# Patient Record
Sex: FEMALE | Race: WHITE | HISPANIC OR LATINO
[De-identification: names, ages, dates, MRNs, and addresses within clinical notes are randomized per-mention and may not be internally consistent; named-entity substitution may affect disease eponyms.]

---

## 2019-04-21 ENCOUNTER — TRANSCRIPTION ENCOUNTER (OUTPATIENT)
Age: 24
End: 2019-04-21

## 2019-04-24 PROBLEM — Z00.00 ENCOUNTER FOR PREVENTIVE HEALTH EXAMINATION: Status: ACTIVE | Noted: 2019-04-24

## 2019-04-26 ENCOUNTER — APPOINTMENT (OUTPATIENT)
Dept: ORTHOPEDIC SURGERY | Facility: CLINIC | Age: 24
End: 2019-04-26
Payer: COMMERCIAL

## 2019-04-26 VITALS
WEIGHT: 155 LBS | HEIGHT: 65 IN | BODY MASS INDEX: 25.83 KG/M2 | SYSTOLIC BLOOD PRESSURE: 117 MMHG | DIASTOLIC BLOOD PRESSURE: 80 MMHG | HEART RATE: 72 BPM

## 2019-04-26 DIAGNOSIS — M47.817 SPONDYLOSIS W/OUT MYELOPATHY OR RADICULOPATHY, LUMBOSACRAL REGION: ICD-10-CM

## 2019-04-26 PROCEDURE — XXXXX: CPT

## 2019-11-26 PROBLEM — M47.817 LUMBOSACRAL SPONDYLOSIS: Status: ACTIVE | Noted: 2019-11-26

## 2020-02-29 ENCOUNTER — TRANSCRIPTION ENCOUNTER (OUTPATIENT)
Age: 25
End: 2020-02-29

## 2020-04-16 ENCOUNTER — EMERGENCY (EMERGENCY)
Facility: HOSPITAL | Age: 25
LOS: 1 days | Discharge: HOME | End: 2020-04-16
Attending: EMERGENCY MEDICINE | Admitting: EMERGENCY MEDICINE
Payer: COMMERCIAL

## 2020-04-16 VITALS
RESPIRATION RATE: 18 BRPM | SYSTOLIC BLOOD PRESSURE: 124 MMHG | TEMPERATURE: 98 F | OXYGEN SATURATION: 98 % | HEART RATE: 90 BPM | WEIGHT: 160.06 LBS | DIASTOLIC BLOOD PRESSURE: 89 MMHG

## 2020-04-16 DIAGNOSIS — R05 COUGH: ICD-10-CM

## 2020-04-16 PROCEDURE — 99284 EMERGENCY DEPT VISIT MOD MDM: CPT

## 2020-04-16 PROCEDURE — 99283 EMERGENCY DEPT VISIT LOW MDM: CPT

## 2020-04-16 PROCEDURE — 87635 SARS-COV-2 COVID-19 AMP PRB: CPT

## 2020-04-16 RX ORDER — ACETAMINOPHEN 500 MG
650 TABLET ORAL ONCE
Refills: 0 | Status: COMPLETED | OUTPATIENT
Start: 2020-04-16 | End: 2020-04-16

## 2020-04-16 RX ORDER — ALBUTEROL 90 UG/1
2 AEROSOL, METERED ORAL
Qty: 1 | Refills: 0
Start: 2020-04-16 | End: 2020-04-22

## 2020-04-16 RX ADMIN — Medication 650 MILLIGRAM(S): at 21:37

## 2020-04-16 NOTE — ED ADULT NURSE NOTE - OBJECTIVE STATEMENT
Pt is alert, came to the ER due to cough and headache for the last 2 weeks. Pt denies any medical or surgical problems, non smoker.

## 2020-04-16 NOTE — ED ADULT TRIAGE NOTE - CHIEF COMPLAINT QUOTE
pt presents to ED for evaluation of allergic like symptoms. Pt c/o generalized rash, itching, swollen eyes and constant coughs x 2 weeks.

## 2020-04-16 NOTE — ED ADULT NURSE NOTE - NSIMPLEMENTINTERV_GEN_ALL_ED
Implemented All Universal Safety Interventions:  [place] to call system. Call bell, personal items and telephone within reach. Instruct patient to call for assistance. Room bathroom lighting operational. Non-slip footwear when patient is off stretcher. Physically safe environment: no spills, clutter or unnecessary equipment. Stretcher in lowest position, wheels locked, appropriate side rails in place.

## 2020-04-16 NOTE — ED PROVIDER NOTE - CLINICAL SUMMARY MEDICAL DECISION MAKING FREE TEXT BOX
24 yr old female with hx of asthma presents with cough, nasal congestion, headache for the last 2 weeks. Pt reports intermittent hand itching, currently resolved due to taking benadryl. 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 Pt denies any chest pain, sob or any other symptoms   well appearing, nasal congestion noted , lungs clear, VSS   covid swab sent, stable for dc with home isolation, and supportive tx

## 2020-04-16 NOTE — ED PROVIDER NOTE - OBJECTIVE STATEMENT
24 yr old female with hx of asthma presents with cough, nasal congestion, headache for the last 2 weeks. Pt reports intermittent hand itching, currently resolved due to taking benadryl. 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 Pt denies any chest pain, sob or any other symptoms

## 2020-04-16 NOTE — ED PROVIDER NOTE - ATTENDING CONTRIBUTION TO CARE
I have personally seen and examined this patient. I have fully participated in the care of this patient. I have reviewed all pertinent clinical information, including history physical exam, plan and the PA's note and agree except as noted  24 yr old female with hx of asthma presents with cough, nasal congestion, headache for the last 2 weeks. Pt reports intermittent hand itching, currently resolved due to taking benadryl. 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 Pt denies any chest pain, sob or any other symptoms  PE; General:     NAD, well-nourished, well-appearing  Head:     NC/AT, EOMI, oral mucosa moist  Neck:     supple  Lungs:     CTA b/l, no w/r/r  CVS:     S1S2, RRR, no m/g/r  Abd:     +BS, s/nt/nd, no organomegaly  Ext:    2+ radial and pedal pulses, no c/c/e  Neuro: grossly intact

## 2020-04-16 NOTE — ED PROVIDER NOTE - PATIENT PORTAL LINK FT
You can access the FollowMyHealth Patient Portal offered by [facility] by registering at the following website: [email or web address]. By joining [employer]’s FollowMyHealth portal, you will also be able to view your health information using other applications (apps) compatible with our system.

## 2020-04-16 NOTE — ED PROVIDER NOTE - NSFOLLOWUPINSTRUCTIONS_ED_ALL_ED_FT
1. You were seen in the ED and underwent testing for the novel coronarvirus (COVID-19). The results are not back yet and you will be contacted with the result which can take up to 7 days. You were also tested for other common viruses such as the flu and cold viruses. You will be notified if you test positive for any of these.    2. Until your test results are back, YOU MUST SELF-QUARANTINE until you are told to other otherwise by [facility] or the local [facility] department. This is extremely important to limit the spread of this virus. Please refer closely to the packet provided to you on the specifics of the process of self-quarantine.    3. If you end up testing positive for the virus, you will instructed as to when you can return to your usual activities. If you do not hear from anyone in 7 days, please call [phone] or your local health department for guidance.     4. Return to the ED for difficulty breathing.    5. You may over the counter acetaminophen (Tylenol) 650mg every 6 hours as needed for fever or pain. There is some concern in the medical community about using ibuprofen (Advil, Motrin) and other NSAIDs in people with COVID infections and until there is more research on this subject it may be best to avoid NSAIDs like ibuprofen at the moment unless you have an allergy to acetaminophen (Tylenol).  Do NOT exceed 3500mg acetaminophen in 24 hours.  Please do not take these medications if you do not have pain or fever or if you have any history of liver disease.     -------------    What is a coronavirus?  Coronaviruses are a large family of viruses that cause illnesses ranging from the common cold to more severe diseases such as Middle East Respiratory Syndrome (MERS) and Severe Acute Respiratory Syndrome (SARS).    What is Novel Coronavirus (COVID-19)?  The Centers for Disease Control and Prevention (CDC) is closely monitoring the outbreak caused by COVID-19. For the latest information about COVID-19, visit the CDC website at CDC.gov/Coronavirus    How are coronaviruses spread?  Coronaviruses can be transmitted from person to person, usually after close contact with an infected  person (for example, in a household, workplace, or healthcare setting), via droplets that become airborne after a cough or sneeze. These droplets can then infect a nearby person. Transmission can also occur by touching recently contaminated surfaces.    Is there a treatment for a COVID-19?  There is no specific treatment for disease caused by COVID-19. However, many of the symptoms can be treated based on the patient’s clinical condition. Supportive care for infected persons can be highly effective.    What are the symptoms of coronavirus infection?  It depends on the virus, but common signs include fever and/or respiratory symptoms such as cough and shortness of breath. In more severe cases, infection can cause pneumonia, severe acute respiratory syndrome, kidney failure and even death. Fortunately, most cases of COVID-19 have an illness no different than the influenza (flu), with a majority of these patients having mild symptoms and overall mortality which appears to be not much different than the flu.    What can I do to protect myself?  The best precautionary measures:  – washing your hands  – covering your cough  – disinfecting surfaces  – it is also advisable to avoid close contact with anyone showing symptoms of respiratory illness such as coughing and sneezing  – those with symptoms should wear a surgical mask when around others    What can I do to protect those around me?  If you have been identified as someone who may be infected with COVID-19, we recommend you follow the self-isolation procedures outlined on the following page to protect those around you and to limit the spread of this virus.    We recommend the below precautionary steps from now until 14 days from when you returned from your travel or date of your last known possible contact:    — Do not go to work, school or public areas. Avoid using public transportation, ridesharing or taxis.  — As much as possible, separate yourself from other people in your home. If you can, you should stay in a room and away from other people. Also, you should use a separate bathroom if available.  — Wear the supplied mask whenever you are around other people.  — If you have a non-urgent medical appointment, please reschedule for a later date. If the appointment is urgent, please call the health care provider and tell them that you are on self-isolation for possible COVID-19. This will help the health care provider’s office take steps to keep other people from getting infected or exposed. If you can reschedule routine appointments, do so.  — Wash your hands often with soap and water for at least 15 to 20 seconds or clean your hands with an alcohol-based hand  that contains 60 to 95% alcohol, covering all surfaces of your hands and rubbing them together until they feel dry. Soap and water should be used preferentially if hands are visibly dirty.  — Cover your mouth and nose with a tissue when you cough or sneeze. Throw used tissues in a lined trash can. Immediately wash your hands.  — Avoid touching your eyes, nose, and mouth with your hands.  — Avoid sharing personal household items. You should not share dishes, drinking glasses, cups, eating utensils, towels, or bedding with other people or pets in your home. After using these items, they should be washed thoroughly with soap and water.  — Clean and disinfect all “high-touch” surfaces every day. High touch surfaces include counters, tabletops, doorknobs, light switches, remote controls, bathroom fixtures, toilets, phones, keyboards, tablets, and bedside tables. Also, clean any surfaces that may have blood, stool, or body fluids on them.    ------------------------------------------  Information for patients who have received a COVID-19 test.    The COVID-19 (novel coronavirus) test  Results may take up to 7 days to become available.      If your result is positive, you will receive a phone call from one of our coronavirus specialists. While we will do our best to also call patients with a negative test result, the sheer volume of tests being performed may make this difficult to do in a timely fashion. If you haven’t heard from us within 5 days and you’d like to check on your results, you can check our [employer] [facility]rina or call one of our coronavirus specialists at [address] [place] [address] (available 24/7)    Please DO NOT call the site where you received the test to obtain your results.    If the test is positive -   You will continue home isolation until you are completely well, you have no fever, and it has been at least 14 days since your positive test. The health department in your city or county may also contact you with additional instructions.    If your test is negative -    You will be able to stop home isolation and resume standard precautions, similiar to how you would manage the common cold or flu.  If you have any questions, you can reach out to one of our coronavirus specialists at [address] [place] [address].    REMEMBER - a negative COVID test means you were negative AT THE TIME OF TESTING, and it is possible to have contracted COVID after being tested.

## 2020-04-17 LAB — SARS-COV-2 RNA SPEC QL NAA+PROBE: SIGNIFICANT CHANGE UP

## 2020-12-06 ENCOUNTER — TRANSCRIPTION ENCOUNTER (OUTPATIENT)
Age: 25
End: 2020-12-06

## 2022-01-26 NOTE — ED PROVIDER NOTE - CPE EDP [facility] NORM
Patient tolerated PRBC with no complications. VSS. Pt instructed to call MD with any problems. NAD. Pt discharged home independently.     normal...

## 2022-07-20 ENCOUNTER — NON-APPOINTMENT (OUTPATIENT)
Age: 27
End: 2022-07-20